# Patient Record
Sex: MALE | Race: WHITE | NOT HISPANIC OR LATINO
[De-identification: names, ages, dates, MRNs, and addresses within clinical notes are randomized per-mention and may not be internally consistent; named-entity substitution may affect disease eponyms.]

---

## 2017-06-27 ENCOUNTER — APPOINTMENT (OUTPATIENT)
Dept: OPHTHALMOLOGY | Facility: CLINIC | Age: 47
End: 2017-06-27

## 2017-06-27 DIAGNOSIS — H57.8 OTHER SPECIFIED DISORDERS OF EYE AND ADNEXA: ICD-10-CM

## 2017-06-27 DIAGNOSIS — Z97.3 PRESENCE OF SPECTACLES AND CONTACT LENSES: ICD-10-CM

## 2017-06-27 DIAGNOSIS — Z87.820 PERSONAL HISTORY OF TRAUMATIC BRAIN INJURY: ICD-10-CM

## 2017-06-27 DIAGNOSIS — Z86.79 PERSONAL HISTORY OF OTHER DISEASES OF THE CIRCULATORY SYSTEM: ICD-10-CM

## 2017-06-27 DIAGNOSIS — Z88.9 ALLERGY STATUS TO UNSPECIFIED DRUGS, MEDICAMENTS AND BIOLOGICAL SUBSTANCES: ICD-10-CM

## 2017-06-27 DIAGNOSIS — H35.373 PUCKERING OF MACULA, BILATERAL: ICD-10-CM

## 2017-06-27 RX ORDER — OLMESARTAN MEDOXOMIL 20 MG/1
20 TABLET, FILM COATED ORAL
Refills: 0 | Status: ACTIVE | COMMUNITY

## 2018-08-03 ENCOUNTER — APPOINTMENT (OUTPATIENT)
Dept: ULTRASOUND IMAGING | Facility: CLINIC | Age: 48
End: 2018-08-03
Payer: COMMERCIAL

## 2018-08-03 ENCOUNTER — OUTPATIENT (OUTPATIENT)
Dept: OUTPATIENT SERVICES | Facility: HOSPITAL | Age: 48
LOS: 1 days | End: 2018-08-03
Payer: COMMERCIAL

## 2018-08-03 DIAGNOSIS — Z00.8 ENCOUNTER FOR OTHER GENERAL EXAMINATION: ICD-10-CM

## 2018-08-03 PROCEDURE — 76700 US EXAM ABDOM COMPLETE: CPT | Mod: 26

## 2018-08-03 PROCEDURE — 76700 US EXAM ABDOM COMPLETE: CPT

## 2019-01-29 ENCOUNTER — APPOINTMENT (OUTPATIENT)
Dept: OPHTHALMOLOGY | Facility: CLINIC | Age: 49
End: 2019-01-29

## 2019-05-14 ENCOUNTER — APPOINTMENT (OUTPATIENT)
Dept: OPHTHALMOLOGY | Facility: CLINIC | Age: 49
End: 2019-05-14
Payer: COMMERCIAL

## 2019-05-14 DIAGNOSIS — H01.00A UNSPECIFIED BLEPHARITIS RIGHT EYE, UPPER AND LOWER EYELIDS: ICD-10-CM

## 2019-05-14 PROCEDURE — 92014 COMPRE OPH EXAM EST PT 1/>: CPT

## 2023-09-15 ENCOUNTER — EMERGENCY (EMERGENCY)
Facility: HOSPITAL | Age: 53
LOS: 1 days | Discharge: HOME | End: 2023-09-15
Attending: EMERGENCY MEDICINE | Admitting: EMERGENCY MEDICINE
Payer: COMMERCIAL

## 2023-09-15 VITALS
RESPIRATION RATE: 17 BRPM | SYSTOLIC BLOOD PRESSURE: 147 MMHG | DIASTOLIC BLOOD PRESSURE: 89 MMHG | HEART RATE: 110 BPM | TEMPERATURE: 99 F | OXYGEN SATURATION: 100 %

## 2023-09-15 PROCEDURE — 93010 ELECTROCARDIOGRAM REPORT: CPT

## 2023-09-15 PROCEDURE — 71046 X-RAY EXAM CHEST 2 VIEWS: CPT | Mod: 26

## 2023-09-15 PROCEDURE — 99284 EMERGENCY DEPT VISIT MOD MDM: CPT

## 2023-09-15 RX ORDER — AZITHROMYCIN 500 MG/1
1 TABLET, FILM COATED ORAL
Qty: 1 | Refills: 0
Start: 2023-09-15 | End: 2023-09-19

## 2023-09-15 RX ORDER — AZITHROMYCIN 500 MG/1
0 TABLET, FILM COATED ORAL
Refills: 0 | DISCHARGE

## 2023-09-15 RX ORDER — IBUPROFEN 200 MG
600 TABLET ORAL ONCE
Refills: 0 | Status: COMPLETED | OUTPATIENT
Start: 2023-09-15 | End: 2023-09-15

## 2023-09-15 RX ADMIN — Medication 600 MILLIGRAM(S): at 12:38

## 2023-09-15 RX ADMIN — Medication 100 MILLIGRAM(S): at 13:29

## 2023-09-15 NOTE — ED PROVIDER NOTE - NSFOLLOWUPINSTRUCTIONS_ED_ALL_ED_FT
1. You presented to the emergency department for: persistent, intermittent coughing.     2. Your evaluation in the emergency department included a physician evaluation. Your work-up did not reveal any findings indicating the need for admission to the hospital or any emergent interventions at this time.     3. It is recommended that you follow-up with your primary care provider for continuous management.     If needed, to arrange an appointment with a primary care provider please call: [phone]    4. Please continue taking your regular medications as prescribed. We will also be prescribing you benzonatate as needed to help suppress cough.     For pain you may take 400-600 mg IBUPROFEN or 500-1000mg ACETAMINOPHEN every 6-8 hours - as needed.  This is an over-the-counter medication - please read the instructions for use and warnings on the label. If you have any questions regarding its use, you may refer them to your local pharmacist.    5. PLEASE RETURN TO THE EMERGENCY DEPARTMENT IMMEDIATELY IF you develop any fevers not responding to over the counter medications, uncontrollable nausea and vomiting, an inability to tolerate eating and drinking, difficulty breathing, chest pain, a severe increase in your symptoms or pain, or any other new symptoms that concern you. 1. You presented to the emergency department for: bronchitis.     2. Your evaluation in the emergency department included a physician evaluation. Your work-up did not reveal any findings indicating the need for admission to the hospital or any emergent interventions at this time.     3. It is recommended that you follow-up with your primary care provider for continuous management.     If needed, to arrange an appointment with a primary care provider please call: [phone]    4. Please continue taking your regular medications as prescribed. We will also be prescribing you a z-pack.     For pain you may take 400-600 mg IBUPROFEN or 500-1000mg ACETAMINOPHEN every 6-8 hours - as needed.  This is an over-the-counter medication - please read the instructions for use and warnings on the label. If you have any questions regarding its use, you may refer them to your local pharmacist.    5. PLEASE RETURN TO THE EMERGENCY DEPARTMENT IMMEDIATELY IF you develop any fevers not responding to over the counter medications, uncontrollable nausea and vomiting, an inability to tolerate eating and drinking, difficulty breathing, chest pain, a severe increase in your symptoms or pain, or any other new symptoms that concern you.

## 2023-09-15 NOTE — ED PROVIDER NOTE - ATTENDING CONTRIBUTION TO CARE
Attending note:   After face to face evaluation of this patient, I concur with above noted hx, pe, and care plan for this patient.  Weber: 53-year-old male with history of hypertension and obstructive sleep apnea.  Patient presents to ED with 2 weeks of cough.  Patient states cough is intermittent and episodic but when it occurs it is nonstop and intense.  Patient has had only white productive sputum.  Patient denies any fevers and chills but there were many sick contacts at home.  Patient states deep breath and laying down occasionally to her symptoms.  Patient's had no improvement with Mucinex.  Patient denies any difficulty breathing when not having coughing fits.  Patient denies any chest pain when not having coughing fits.  Patient denies any sore throat, travel.  On exam patient initially noted to be well-appearing but then when asked to take deep breath patient noted to have significant coughing but no hypoxia noted.  On exam patient's lungs are clear and oropharynx is clear as well without any erythema or edema.  Heart is regular rate and rhythm.  Patient's pulses equal and strong in all extremities.  Abdomen soft nontender.  There is no chest wall tenderness noted.  Patient likely has a postviral bronchitis but will check for current active viral infection with RVP.  Will also check chest x-ray for possible pneumonia.  Given patient's duration of symptoms may benefit from antibiotics for bronchitis.  Inhalers unlikely to be helpful given short duration of coughing fits.  Will give cough medicine.

## 2023-09-15 NOTE — ED PROVIDER NOTE - CLINICAL SUMMARY MEDICAL DECISION MAKING FREE TEXT BOX
52 y/o M with HTN, JOSHUA presents with 2 weeks of intermittent coughing spells, L chest wall pain, and post nasal drip after exposure to ill family members 2 weeks ago. Likely viral cause, send RVP. CHX to r/o pneumonia. EKG to r/o cardiogenic chest pain. Motrin for chest wall discomfort.

## 2023-09-15 NOTE — ED PROVIDER NOTE - PHYSICAL EXAMINATION
Gen: NAD, AAOx3, non-toxic appearing, very well appearing   HEENT: NCAT, normal conjunctiva, oral mucosa moist, no cobblestoning or exudate of posterior pharynx, nares patent  Lung: speaking in full sentences, good aeration bilaterally, lungs CTA b/l in all lung feilds  CV: regular rate and rhythm. peripheral pulses 2+bilaterally   Abd: soft, ND, NT  MSK: no visible deformities, minot ttp of L lateral chest wall  Neuro: No focal deficits  Skin: Intact, no rashes  Psych: normal affect

## 2023-09-15 NOTE — ED PROVIDER NOTE - PATIENT PORTAL LINK FT
You can access the FollowMyHealth Patient Portal offered by [facility] by registering at the following website: [email or web address]. By joining [email or web address]’s FollowMyHealth portal, you will also be able to view your health information using other applications (apps) compatible with our system.

## 2023-09-15 NOTE — ED PROVIDER NOTE - OBJECTIVE STATEMENT
54 y/o M with HTN, JOSHUA presents with 2 weeks of intermittent coughing spells and L chest wall pain. Pt states that his wife and children were sick about two weeks ago and he became sick as well, he thinks it is covid but all at-home tests have been negative in all family members. Pt states that for the past week he has felt better overall but he still gets episodes of coughing that are intense, productive of white sputum, and cause pain in his chest, especially on the L lower anterior side. Pt attributes this pain to chest soreness of coughing but presents just to make sure everything is okay. Coughing episodes happen several times a day with no known triggers, but are worst in the morning. Pt has been taking Mucinex for symptoms. Pt endorses post nasal drip. Pt states he had strep 8 months ago and this is much different than that. He denies sore throat, hemoptysis, palpitations, NVD,  complaints, numbness, weakness, HA.

## 2023-09-15 NOTE — ED ADULT TRIAGE NOTE - CHIEF COMPLAINT QUOTE
Pt c/o cough and chest pains with cough. Pt endorsing that he feels like he has COVID but did not test himself. Breathing even and unlabored.  Hx HTN, HLD.